# Patient Record
Sex: FEMALE
[De-identification: names, ages, dates, MRNs, and addresses within clinical notes are randomized per-mention and may not be internally consistent; named-entity substitution may affect disease eponyms.]

---

## 2021-03-12 PROBLEM — Z00.00 ENCOUNTER FOR PREVENTIVE HEALTH EXAMINATION: Status: ACTIVE | Noted: 2021-03-12

## 2021-04-29 ENCOUNTER — APPOINTMENT (OUTPATIENT)
Dept: NEUROLOGY | Facility: CLINIC | Age: 81
End: 2021-04-29
Payer: MEDICARE

## 2021-04-29 VITALS
BODY MASS INDEX: 25.3 KG/M2 | WEIGHT: 134 LBS | HEIGHT: 61 IN | SYSTOLIC BLOOD PRESSURE: 157 MMHG | HEART RATE: 84 BPM | DIASTOLIC BLOOD PRESSURE: 70 MMHG

## 2021-04-29 VITALS — TEMPERATURE: 97.3 F

## 2021-04-29 DIAGNOSIS — Z78.9 OTHER SPECIFIED HEALTH STATUS: ICD-10-CM

## 2021-04-29 DIAGNOSIS — Z86.69 PERSONAL HISTORY OF OTHER DISEASES OF THE NERVOUS SYSTEM AND SENSE ORGANS: ICD-10-CM

## 2021-04-29 DIAGNOSIS — Z86.018 PERSONAL HISTORY OF OTHER BENIGN NEOPLASM: ICD-10-CM

## 2021-04-29 DIAGNOSIS — Z86.79 PERSONAL HISTORY OF OTHER DISEASES OF THE CIRCULATORY SYSTEM: ICD-10-CM

## 2021-04-29 DIAGNOSIS — Z86.39 PERSONAL HISTORY OF OTHER ENDOCRINE, NUTRITIONAL AND METABOLIC DISEASE: ICD-10-CM

## 2021-04-29 PROCEDURE — 99204 OFFICE O/P NEW MOD 45 MIN: CPT

## 2021-04-29 RX ORDER — BRIMONIDINE TARTRATE 2 MG/MG
0.2 SOLUTION/ DROPS OPHTHALMIC
Refills: 0 | Status: ACTIVE | COMMUNITY

## 2021-04-29 NOTE — PHYSICAL EXAM
[General Appearance - Alert] : alert [Oriented To Time, Place, And Person] : oriented to person, place, and time [Person] : oriented to person [Place] : oriented to place [Time] : oriented to time [Short Term Intact] : short term memory intact [Fluency] : fluency intact [Current Events] : adequate knowledge of current events [Cranial Nerves Optic (II)] : visual acuity intact bilaterally,  visual fields full to confrontation, pupils equal round and reactive to light [Cranial Nerves Oculomotor (III)] : extraocular motion intact [Cranial Nerves Facial (VII)] : face symmetrical [Cranial Nerves Vestibulocochlear (VIII)] : hearing was intact bilaterally [Cranial Nerves Accessory (XI - Cranial And Spinal)] : head turning and shoulder shrug symmetric [Cranial Nerves Hypoglossal (XII)] : there was no tongue deviation with protrusion [Motor Tone] : muscle tone was normal in all four extremities [Motor Strength] : muscle strength was normal in all four extremities [No Muscle Atrophy] : normal bulk in all four extremities [Sensation Tactile Decrease] : light touch was intact [Sensation Pain / Temperature Decrease] : pain and temperature was intact [Abnormal Walk] : normal gait [Coordination - Dysmetria Impaired Finger-to-Nose Bilateral] : not present [1+] : Patella left 1+ [Plantar Reflex Right Only] : normal on the right [Plantar Reflex Left Only] : normal on the left [FreeTextEntry8] : While patient lifted her left foot off the ground there was a very quick contraction that involves the iliopsoas and the quadriceps

## 2021-04-29 NOTE — DISCUSSION/SUMMARY
[FreeTextEntry1] : 80-year-old woman who is here for initial consultation of gradual onset of left leg spasms that may be localized to the motor pathway in the central nervous system vs. muscle pathology.  It is less likely localized to the peripheral nerves or the lower back as her entire body had a spasm once or twice.  Will check EEG to see if there is any seizure correlation.  We will also check MRI of the brain with and without gadolinium for any signs of inflammation or lesion to account for this motor finding.  Patient will follow-up with me after the studies are completed\par \par I spent the time noted on the day of this patient encounter preparing for, providing and documenting the above E/M service and counseling and educate patient on differential, workup, disease course, and treatment/management. Education was provided to the patient during this encounter. All questions and concerns were answered and addressed in detail. The patient verbalized understanding and agreed to plan. Patient was advised to continue to monitor for neurologic symptoms and to notify my office or go to the nearest emergency room if there are any changes. Any orders/referrals and communications were provided as well. \par Side effects of the above medications were discussed in detail including but not limited to applicable black box warning and teratogenicity as appropriate. \par Patient was advised to bring previous records to my office. \par \par \par

## 2021-04-29 NOTE — HISTORY OF PRESENT ILLNESS
[FreeTextEntry1] : 80-year-old woman who is here for initial consultation of 2 years duration of left leg muscle spasms.  Patient's symptoms gradually started and there were no medication changes, motor vehicle accident or other trauma, no illness preceding this onset.  Soon it spread to the right leg however the symptoms are still predominantly on the left foot.  This occurs whenever she lifts her left leg off the ground while seated.  This never occurs whenever she walks.  Once or twice patient felt the muscle jerk throughout her body while she was in bed.  She had no alteration in her sensorium.  MRI of the brain done by Dr. Cook in 2019 was negative for stroke.

## 2021-05-20 ENCOUNTER — APPOINTMENT (OUTPATIENT)
Dept: MRI IMAGING | Facility: CLINIC | Age: 81
End: 2021-05-20
Payer: MEDICARE

## 2021-05-20 ENCOUNTER — OUTPATIENT (OUTPATIENT)
Dept: OUTPATIENT SERVICES | Facility: HOSPITAL | Age: 81
LOS: 1 days | End: 2021-05-20
Payer: MEDICARE

## 2021-05-20 DIAGNOSIS — M62.838 OTHER MUSCLE SPASM: ICD-10-CM

## 2021-05-20 PROCEDURE — G1004: CPT

## 2021-05-20 PROCEDURE — 70553 MRI BRAIN STEM W/O & W/DYE: CPT

## 2021-05-20 PROCEDURE — A9585: CPT

## 2021-05-20 PROCEDURE — 70553 MRI BRAIN STEM W/O & W/DYE: CPT | Mod: 26,MG

## 2021-05-21 ENCOUNTER — NON-APPOINTMENT (OUTPATIENT)
Age: 81
End: 2021-05-21

## 2021-05-27 ENCOUNTER — NON-APPOINTMENT (OUTPATIENT)
Age: 81
End: 2021-05-27

## 2021-06-17 ENCOUNTER — APPOINTMENT (OUTPATIENT)
Dept: NEUROLOGY | Facility: CLINIC | Age: 81
End: 2021-06-17
Payer: MEDICARE

## 2021-06-17 PROCEDURE — 95816 EEG AWAKE AND DROWSY: CPT

## 2021-06-18 PROCEDURE — 95708 EEG WO VID EA 12-26HR UNMNTR: CPT

## 2021-06-19 PROCEDURE — 95708 EEG WO VID EA 12-26HR UNMNTR: CPT

## 2021-06-20 PROCEDURE — 95700 EEG CONT REC W/VID EEG TECH: CPT

## 2021-06-20 PROCEDURE — 95705 EEG W/O VID 2-12 HR UNMNTR: CPT

## 2021-06-20 PROCEDURE — 95723 EEG PHY/QHP>60<84 HR W/O VID: CPT

## 2021-06-20 PROCEDURE — 95708 EEG WO VID EA 12-26HR UNMNTR: CPT

## 2021-06-22 ENCOUNTER — NON-APPOINTMENT (OUTPATIENT)
Age: 81
End: 2021-06-22

## 2021-08-13 ENCOUNTER — APPOINTMENT (OUTPATIENT)
Dept: NEUROLOGY | Facility: CLINIC | Age: 81
End: 2021-08-13
Payer: MEDICARE

## 2021-08-13 VITALS
DIASTOLIC BLOOD PRESSURE: 83 MMHG | BODY MASS INDEX: 25.49 KG/M2 | HEART RATE: 78 BPM | HEIGHT: 61 IN | SYSTOLIC BLOOD PRESSURE: 143 MMHG | WEIGHT: 135 LBS

## 2021-08-13 PROCEDURE — 99214 OFFICE O/P EST MOD 30 MIN: CPT

## 2021-08-13 NOTE — PHYSICAL EXAM
[General Appearance - Alert] : alert [Oriented To Time, Place, And Person] : oriented to person, place, and time [Person] : oriented to person [Place] : oriented to place [Time] : oriented to time [Short Term Intact] : short term memory intact [Fluency] : fluency intact [Current Events] : adequate knowledge of current events [Cranial Nerves Optic (II)] : visual acuity intact bilaterally,  visual fields full to confrontation, pupils equal round and reactive to light [Cranial Nerves Oculomotor (III)] : extraocular motion intact [Cranial Nerves Vestibulocochlear (VIII)] : hearing was intact bilaterally [Cranial Nerves Accessory (XI - Cranial And Spinal)] : head turning and shoulder shrug symmetric [Cranial Nerves Hypoglossal (XII)] : there was no tongue deviation with protrusion [Motor Tone] : muscle tone was normal in all four extremities [Motor Strength] : muscle strength was normal in all four extremities [No Muscle Atrophy] : normal bulk in all four extremities [Sensation Tactile Decrease] : light touch was intact [Sensation Pain / Temperature Decrease] : pain and temperature was intact [Abnormal Walk] : normal gait [Coordination - Dysmetria Impaired Finger-to-Nose Bilateral] : not present [1+] : Patella left 1+ [Plantar Reflex Right Only] : normal on the right [Plantar Reflex Left Only] : normal on the left [FreeTextEntry6] : patient has difficulty inserting her left foot into her shoe.  It hovers for a bit before it finally enters the shoe.

## 2021-08-13 NOTE — HISTORY OF PRESENT ILLNESS
[FreeTextEntry1] : 81-year-old woman who is here for initial consultation of 2 years duration of left leg muscle spasms.  Patient's symptoms gradually started and there were no medication changes, motor vehicle accident or other trauma, no illness preceding this onset.  Soon it spread to the right leg however the symptoms are still predominantly on the left foot.  This occurs whenever she lifts her left leg off the ground while seated.  This never occurs whenever she walks.  Once or twice patient felt the muscle jerk throughout her body while she was in bed.  She had no alteration in her sensorium.  MRI of the brain done by Dr. Cook in 2019 was negative for stroke.\par \par Interval history: Patient is here for follow-up.  Patient states she has no diagnosis of atrial fibrillation however she is aware that she has a fast heart rate for which she has regular follow-up with her cardiologist.  Patient MRI of the brain with and without contrast shows chronic ischemic changes in the hemispheres with no enhancing lesion or diffusion restriction.  Patient's EEG was negative as well.  Upon further exploration of her symptoms patient states that her left foot has difficulty going into her shoes.  When she was in the room patient was asked to demonstrate this and patient seems to have dysmetria whenever she is putting on her left shoe.  The right foot had no difficulties and patient has no difficulties with her hands in terms of getting them into gloves or walking up and down the stairs.  Patient notices that this is the only task where she has this difficulty\par \par

## 2021-08-13 NOTE — DISCUSSION/SUMMARY
[FreeTextEntry1] : 81-year-old woman who is here for follow-up of her leg spasms that involve less of her proximal muscles but more of her left foot in a particular task.  Will have her see my movement colleagues for evaluation of possible tasks related dystonia.  She will follow-up with me afterwards\par \par I spent the time noted on the day of this patient encounter preparing for, providing and documenting the above E/M service and counseling and educate patient on differential, workup, disease course, and treatment/management. Education was provided to the patient during this encounter. All questions and concerns were answered and addressed in detail. The patient verbalized understanding and agreed to plan. Patient was advised to continue to monitor for neurologic symptoms and to notify my office or go to the nearest emergency room if there are any changes. Any orders/referrals and communications were provided as well. \par Side effects of the above medications were discussed in detail including but not limited to applicable black box warning and teratogenicity as appropriate. \par Patient was advised to bring previous records to my office. \par \par \par

## 2021-11-18 ENCOUNTER — APPOINTMENT (OUTPATIENT)
Dept: NEUROLOGY | Facility: CLINIC | Age: 81
End: 2021-11-18
Payer: MEDICARE

## 2021-11-18 VITALS
HEIGHT: 61 IN | WEIGHT: 132 LBS | BODY MASS INDEX: 24.92 KG/M2 | HEART RATE: 88 BPM | DIASTOLIC BLOOD PRESSURE: 82 MMHG | SYSTOLIC BLOOD PRESSURE: 155 MMHG

## 2021-11-18 PROCEDURE — 99215 OFFICE O/P EST HI 40 MIN: CPT

## 2021-11-18 NOTE — DISCUSSION/SUMMARY
[FreeTextEntry1] : This is an 81-year-old right-handed female who presents with chief complaints of leg spasms since last year.  She feels that this is getting more frequent.  Spasms happen only when she is trying to move her legs a certain way such as wearing shoes and slacks\par Exam is notable for slightly reduced finger taps on the left.  No tremor or rigidity.  Reduced pinprick in the left foot gait is slow antalgic appearing\par MRI of the brain with and without contrast-small vessel ischemic changes\par EEG-normal\par \par Impression-leg spasms, myoclonic movement unclear etiology\par \par Plan\par Patient noted to have slightly reduced finger taps on the left, slow walking-we will get a DaTscan to assess dopamine function\par Spine MRI-to rule out structural causes, decreased pinprick sensation in the left foot\par Patient reports that she had recent blood work done at her cardiologist.  I have requested her to send me those reports.  If not already done would like to get B12, folate, thyroid, paraneoplastic panel\par All questions were addressed and answered\par Follow-up in 2 to 3 months

## 2021-11-18 NOTE — PHYSICAL EXAM
[FreeTextEntry1] : On exam patient is awake and alert.  She is pleasant cooperative with the exam.\par Extraocular movements are intact, face is symmetric, tongue and uvula midline and symmetric\par Strength 5/5\par Deep tendon reflexes 1+ symmetric\par Sensory exam-reduced pinprick in the dorsum of left foot\par \par No resting action or postural tremors are seen\par Hand opening and closing 1 bilaterally\par Finger taps normal on the right 1 on the left\par Leg agility is normal bilaterally\par Rapid alternating movements are normal bilaterally\par Tone is normal bilaterally\par No dysmetria or dysdiadochokinesia, heel-to-shin is intact bilaterally\par Plantars are downgoing\par Slow to get up from chair needed 2 attempts, states that her right knee hurts\par \par Gait is slow, antalgic, not shuffling, posture may be slightly stooped, slight inversion of the feet while walking left worse than right\par \par 2 episodes of leg jerking on the left were noticed.  1 when patient was trying to wear her shoe and the second when she lifted her leg up for exam

## 2021-12-15 ENCOUNTER — APPOINTMENT (OUTPATIENT)
Dept: MRI IMAGING | Facility: CLINIC | Age: 81
End: 2021-12-15
Payer: MEDICARE

## 2021-12-15 ENCOUNTER — OUTPATIENT (OUTPATIENT)
Dept: OUTPATIENT SERVICES | Facility: HOSPITAL | Age: 81
LOS: 1 days | End: 2021-12-15
Payer: MEDICARE

## 2021-12-15 DIAGNOSIS — M62.838 OTHER MUSCLE SPASM: ICD-10-CM

## 2021-12-15 PROCEDURE — 72141 MRI NECK SPINE W/O DYE: CPT | Mod: 26,MG

## 2021-12-15 PROCEDURE — 72148 MRI LUMBAR SPINE W/O DYE: CPT | Mod: 26,MG

## 2021-12-15 PROCEDURE — G1004: CPT

## 2021-12-15 PROCEDURE — 72148 MRI LUMBAR SPINE W/O DYE: CPT | Mod: MG

## 2021-12-15 PROCEDURE — 72146 MRI CHEST SPINE W/O DYE: CPT | Mod: 26,MG

## 2021-12-15 PROCEDURE — 72146 MRI CHEST SPINE W/O DYE: CPT | Mod: MG

## 2021-12-15 PROCEDURE — 72141 MRI NECK SPINE W/O DYE: CPT | Mod: MG

## 2021-12-22 ENCOUNTER — RESULT REVIEW (OUTPATIENT)
Age: 81
End: 2021-12-22

## 2021-12-29 ENCOUNTER — APPOINTMENT (OUTPATIENT)
Dept: NUCLEAR MEDICINE | Facility: IMAGING CENTER | Age: 81
End: 2021-12-29
Payer: MEDICARE

## 2021-12-29 ENCOUNTER — RESULT REVIEW (OUTPATIENT)
Age: 81
End: 2021-12-29

## 2021-12-29 ENCOUNTER — OUTPATIENT (OUTPATIENT)
Dept: OUTPATIENT SERVICES | Facility: HOSPITAL | Age: 81
LOS: 1 days | End: 2021-12-29
Payer: MEDICARE

## 2021-12-29 DIAGNOSIS — M62.838 OTHER MUSCLE SPASM: ICD-10-CM

## 2021-12-29 PROCEDURE — 78803 RP LOCLZJ TUM SPECT 1 AREA: CPT | Mod: 26,MG

## 2021-12-29 PROCEDURE — G1004: CPT

## 2021-12-29 PROCEDURE — A9584: CPT

## 2021-12-29 PROCEDURE — 78803 RP LOCLZJ TUM SPECT 1 AREA: CPT | Mod: MG

## 2022-01-14 ENCOUNTER — OUTPATIENT (OUTPATIENT)
Dept: OUTPATIENT SERVICES | Facility: HOSPITAL | Age: 82
LOS: 1 days | End: 2022-01-14
Payer: MEDICARE

## 2022-01-14 ENCOUNTER — APPOINTMENT (OUTPATIENT)
Dept: CT IMAGING | Facility: CLINIC | Age: 82
End: 2022-01-14
Payer: MEDICARE

## 2022-01-14 DIAGNOSIS — M62.838 OTHER MUSCLE SPASM: ICD-10-CM

## 2022-01-14 PROCEDURE — 71250 CT THORAX DX C-: CPT | Mod: 26,MG

## 2022-01-14 PROCEDURE — G1004: CPT

## 2022-01-14 PROCEDURE — 71250 CT THORAX DX C-: CPT | Mod: MG

## 2022-01-20 ENCOUNTER — APPOINTMENT (OUTPATIENT)
Dept: NEUROLOGY | Facility: CLINIC | Age: 82
End: 2022-01-20
Payer: MEDICARE

## 2022-01-20 PROCEDURE — 99214 OFFICE O/P EST MOD 30 MIN: CPT | Mod: 95

## 2022-01-20 NOTE — HISTORY OF PRESENT ILLNESS
[Home] : at home, [unfilled] , at the time of the visit. [Other Location: e.g. Home (Enter Location, City,State)___] : at [unfilled] [Verbal consent obtained from patient] : the patient, [unfilled] [FreeTextEntry1] : This is an 81-year-old right-handed female who was kindly referred by Dr. Licea to movement disorders clinic to evaluate bilateral leg spasm.  At this visit she is accompanied by her son Dwaine\par History is obtained from patient and from review of records\par Patient states that about last year she noticed spasms in her leg more on the left than the right especially when she was trying to do certain tasks such as wear her shoes, slacks or moves her leg a certain way.  She describes this as a quick jerky movement.  Nonpainful.  She denies any difficulty in activities such as walking getting up from a chair turning in bed.  She denies any restless leg-like symptoms.  No weakness.  No change in bowel or bladder habits.  She denies any back pain.  She denies any tremors.  She has no difficulty using utensils.\par She denies any anosmia, dysphagia, drooling, sleep disorders or fall\par She denies any weight loss or change in appetite\par \par She does report some change in her handwriting whereas its not as needed may be getting smaller.  She has difficulty with tiny hooks of jewelry and earrings.\par She denies any new medications.  She denies any history of use of antidopaminergic medication\par \par Review of systems-a complete review of systems is performed and is negative except as listed in HPI\par \par Past medical history A. fib\par Family history unremarkable\par  \par \par Interval history January 20, 2022.  Patient presents to follow-up on left leg abnormal movements.  This is a telehealth visit.  Patient wishes to follow-up on results of the work-up done which included a DaTscan MRI of the spine and CT chest.  Patient states that the symptoms continue unchanged.  She still has intermittent movement of the left leg especially when she is trying to wear her shoes or move her leg.  This is not painful is not bothersome and does not interfere with daily activities.  At times she feels that it is more intense.  She also reports having knee pain and arthritis and is undergoing physical therapy now.  She has not had the labs ordered by me however she reports that through her primary care physician she had a normal CMP including calcium level.\par

## 2022-01-20 NOTE — DISCUSSION/SUMMARY
[FreeTextEntry1] : This is an 81-year-old right-handed female who presents with chief complaints of leg spasms since last year.  She feels that this is getting more frequent.  Spasms happen only when she is trying to move her legs a certain way such as wearing shoes and slacks\par Exam is notable for slightly reduced finger taps on the left.  No tremor or rigidity.  Reduced pinprick in the left foot gait is slow antalgic appearing\par MRI of the brain with and without contrast-small vessel ischemic changes\par EEG-normal\par BETH normal\par MRI C/T - no spondylosis, no change in cord signal\par Impression-leg spasms, myoclonic movement unclear etiology\par \par Plan\par Results of DaTscan, MRI spine and CT chest were discussed with her.\par We will mail report of CT chest to patient's home address so she can discuss the thyroid signal change with her primary care physician\par At present patient states that the movements are not bothersome and would not like to try medication for it\par check B12, folate, thyroid, paraneoplastic panel ESR,MARIN -we will mail prescription to her\par All questions were addressed and answered\par Follow-up in 2 to 3 months

## 2023-09-21 ENCOUNTER — LABORATORY RESULT (OUTPATIENT)
Age: 83
End: 2023-09-21

## 2023-09-21 ENCOUNTER — APPOINTMENT (OUTPATIENT)
Dept: NEUROLOGY | Facility: CLINIC | Age: 83
End: 2023-09-21
Payer: MEDICARE

## 2023-09-21 VITALS
HEIGHT: 61 IN | SYSTOLIC BLOOD PRESSURE: 176 MMHG | DIASTOLIC BLOOD PRESSURE: 77 MMHG | BODY MASS INDEX: 23.6 KG/M2 | HEART RATE: 73 BPM | WEIGHT: 125 LBS

## 2023-09-21 DIAGNOSIS — M62.838 OTHER MUSCLE SPASM: ICD-10-CM

## 2023-09-21 PROCEDURE — 99215 OFFICE O/P EST HI 40 MIN: CPT

## 2023-09-21 RX ORDER — FUROSEMIDE 20 MG/1
20 TABLET ORAL DAILY
Refills: 0 | Status: ACTIVE | COMMUNITY

## 2023-09-22 ENCOUNTER — NON-APPOINTMENT (OUTPATIENT)
Age: 83
End: 2023-09-22

## 2023-09-22 LAB
25(OH)D3 SERPL-MCNC: 42 NG/ML
CRP SERPL-MCNC: <3 MG/L
ERYTHROCYTE [SEDIMENTATION RATE] IN BLOOD BY WESTERGREN METHOD: 104 MM/HR
FOLATE SERPL-MCNC: >20 NG/ML
T4 SERPL-MCNC: 7 UG/DL
THYROGLOB AB SERPL-ACNC: NORMAL
THYROPEROXIDASE AB SERPL IA-ACNC: <10 IU/ML
TSH SERPL-ACNC: 2.59 UIU/ML
VIT B12 SERPL-MCNC: 659 PG/ML

## 2023-09-25 LAB
ANA SER IF-ACNC: NEGATIVE
T PALLIDUM AB SER QL IA: NEGATIVE

## 2023-09-29 LAB
AMPA-R ABCBA: NEGATIVE
AMPHIPHYSIN IGG TITR SER IF: NEGATIVE
ANNOTATION COMMENT IMP: NORMAL
CASPR2-IGG CBA, S: NEGATIVE
CV2 IGG TITR SER: NEGATIVE
GABA-B ABCBA: NEGATIVE
GAD65 AB SER-MCNC: 0 NMOL/L
GLIAL NUC TYPE 1 AB TITR SER: NEGATIVE
HU1 AB TITR SER: NEGATIVE
HU2 AB TITR SER IF: NEGATIVE
HU3 AB TITR SER: NEGATIVE
IGLON5 IFA, S: NEGATIVE
IMMUNOLOGIST REVIEW: NORMAL
LGI1-IGG CBA, S: NEGATIVE
NIF IFA, S: NEGATIVE
NMDA-R ABCBA: NEGATIVE
PCA-1 AB TITR SER: NEGATIVE
PCA-2 AB TITR SER: NEGATIVE
PCA-TR AB TITR SER: NEGATIVE
VIT B1 SERPL-MCNC: 109.7 NMOL/L

## 2023-11-02 ENCOUNTER — APPOINTMENT (OUTPATIENT)
Dept: NEUROLOGY | Facility: CLINIC | Age: 83
End: 2023-11-02
Payer: MEDICARE

## 2023-11-02 VITALS
HEIGHT: 61 IN | SYSTOLIC BLOOD PRESSURE: 194 MMHG | DIASTOLIC BLOOD PRESSURE: 94 MMHG | HEART RATE: 81 BPM | BODY MASS INDEX: 23.6 KG/M2 | WEIGHT: 125 LBS

## 2023-11-02 DIAGNOSIS — R29.818 OTHER SYMPTOMS AND SIGNS INVOLVING THE NERVOUS SYSTEM: ICD-10-CM

## 2023-11-02 PROCEDURE — 99215 OFFICE O/P EST HI 40 MIN: CPT

## 2024-02-14 ENCOUNTER — APPOINTMENT (OUTPATIENT)
Dept: NEUROLOGY | Facility: CLINIC | Age: 84
End: 2024-02-14
Payer: MEDICARE

## 2024-02-14 VITALS
BODY MASS INDEX: 23.6 KG/M2 | HEART RATE: 89 BPM | OXYGEN SATURATION: 98 % | WEIGHT: 125 LBS | DIASTOLIC BLOOD PRESSURE: 85 MMHG | TEMPERATURE: 97.9 F | HEIGHT: 61 IN | SYSTOLIC BLOOD PRESSURE: 140 MMHG

## 2024-02-14 DIAGNOSIS — G31.84 MILD COGNITIVE IMPAIRMENT, SO STATED: ICD-10-CM

## 2024-02-14 PROCEDURE — 99215 OFFICE O/P EST HI 40 MIN: CPT

## 2024-02-14 PROCEDURE — G2211 COMPLEX E/M VISIT ADD ON: CPT

## 2024-02-14 RX ORDER — ASPIRIN 81 MG
81 TABLET, DELAYED RELEASE (ENTERIC COATED) ORAL
Refills: 0 | Status: COMPLETED | COMMUNITY
End: 2024-02-14

## 2024-02-14 RX ORDER — HYDROCHLOROTHIAZIDE 12.5 MG/1
12.5 TABLET ORAL
Refills: 0 | Status: COMPLETED | COMMUNITY
End: 2024-02-14

## 2024-02-14 RX ORDER — MEMANTINE HYDROCHLORIDE 14 MG/1
14 CAPSULE, EXTENDED RELEASE ORAL
Qty: 90 | Refills: 3 | Status: ACTIVE | COMMUNITY
Start: 2024-02-14 | End: 1900-01-01

## 2024-02-14 RX ORDER — VALSARTAN 80 MG/1
80 TABLET, COATED ORAL
Refills: 0 | Status: ACTIVE | COMMUNITY

## 2024-02-14 RX ORDER — AMLODIPINE BESYLATE 5 MG/1
5 TABLET ORAL DAILY
Refills: 0 | Status: COMPLETED | COMMUNITY
End: 2024-02-14

## 2024-02-14 RX ORDER — SIMVASTATIN 40 MG/1
40 TABLET, FILM COATED ORAL
Refills: 0 | Status: COMPLETED | COMMUNITY
End: 2024-02-14

## 2024-02-14 RX ORDER — CARBIDOPA AND LEVODOPA 25; 100 MG/1; MG/1
25-100 TABLET ORAL 3 TIMES DAILY
Qty: 270 | Refills: 3 | Status: COMPLETED | COMMUNITY
Start: 2023-11-02 | End: 2024-02-14

## 2024-02-14 NOTE — DISCUSSION/SUMMARY
[FreeTextEntry1] : 83-year-old woman who is here for follow-up of her cognitive impairment that is likely due to Alzheimer's disease versus Lewy bodies dementia.  With her parkinsonian symptoms 1 argues for Lewy bodies dementia however patient declines any hallucinations.  Will start Namenda extended release daily.  Patient will have repeat ESR and thyroglobulin.  Patient will follow-up with me in a couple months.  I spent the time noted on the day of this patient encounter preparing for, providing and documenting the above E/M service and counseling and educate patient on differential, workup, disease course, and treatment/management. Education was provided to the patient during this encounter. All questions and concerns were answered and addressed in detail. The patient verbalized understanding and agreed to plan. Patient was advised to continue to monitor for neurologic symptoms and to notify my office or go to the nearest emergency room if there are any changes. Any orders/referrals and communications were provided as well.  Side effects of the above medications were discussed in detail including but not limited to applicable black box warning and teratogenicity as appropriate.  Patient was advised to bring previous records to my office.

## 2024-02-14 NOTE — HISTORY OF PRESENT ILLNESS
[FreeTextEntry1] : 83-year-old woman who is here for initial consultation of 2 years duration of left leg muscle spasms.  Patient's symptoms gradually started and there were no medication changes, motor vehicle accident or other trauma, no illness preceding this onset.  Soon it spread to the right leg however the symptoms are still predominantly on the left foot.  This occurs whenever she lifts her left leg off the ground while seated.  This never occurs whenever she walks.  Once or twice patient felt the muscle jerk throughout her body while she was in bed.  She had no alteration in her sensorium.  MRI of the brain done by Dr. Cook in 2019 was negative for stroke.  Interval history: Patient is here for follow-up.  Patient states she has no diagnosis of atrial fibrillation however she is aware that she has a fast heart rate for which she has regular follow-up with her cardiologist.  Patient MRI of the brain with and without contrast shows chronic ischemic changes in the hemispheres with no enhancing lesion or diffusion restriction.  Patient's EEG was negative as well.  Upon further exploration of her symptoms patient states that her left foot has difficulty going into her shoes.  When she was in the room patient was asked to demonstrate this and patient seems to have dysmetria whenever she is putting on her left shoe.  The right foot had no difficulties and patient has no difficulties with her hands in terms of getting them into gloves or walking up and down the stairs.  Patient notices that this is the only task where she has this difficulty  Interval history September 21, 2023: Patient had extensive work-up with Dr. Mckeon for her leg spasms with negative DaTscan.  Patient continues to have bilateral leg spasms only when she is putting on her slacks or her shoes.  This was demonstrated during her visit with me today in the office.  Patient may have task specific tremors of the legs and she will follow-up with Dr. Mckeon.  Interval history February 14, 2024: Patient was recently started on Sinemet with Dr. Mckeon however she was not able to tolerate even half a tablet and she was advised to follow-up with Dr. Mckeon.  Patient has a pending neuropsych evaluation for her memory loss.  Patient's Hamblen exam is 11 out of 30.  Please see attached MoCA evaluation.  Patient had a elevation in ESR with normal CRP.  Will repeat.  Patient's thyroglobulin was also clotted and we will repeat that as well.  Patient and family and I had a lengthy discussion regarding treatment.  We discussed Namenda and adulhelm.  Patient was encouraged to be involved more socially and at perform cognitive tasks throughout the day.  Patient son was interested in starting her on Namenda.   Patient also has been having difficulties with memory such as forgetting appointments.  This started about February 2023.  There is a family history of Alzheimer's when her father was in his 80s.  Son claims that there are no hearing issues when he tells her things.

## 2024-02-14 NOTE — PHYSICAL EXAM
[General Appearance - Alert] : alert [Oriented To Time, Place, And Person] : oriented to person, place, and time [Person] : oriented to person [Place] : oriented to place [Time] : oriented to time [Short Term Intact] : short term memory intact [Fluency] : fluency intact [Current Events] : adequate knowledge of current events [Cranial Nerves Optic (II)] : visual acuity intact bilaterally,  visual fields full to confrontation, pupils equal round and reactive to light [Cranial Nerves Oculomotor (III)] : extraocular motion intact [Cranial Nerves Vestibulocochlear (VIII)] : hearing was intact bilaterally [Cranial Nerves Accessory (XI - Cranial And Spinal)] : head turning and shoulder shrug symmetric [Cranial Nerves Hypoglossal (XII)] : there was no tongue deviation with protrusion [Motor Tone] : muscle tone was normal in all four extremities [Motor Strength] : muscle strength was normal in all four extremities [No Muscle Atrophy] : normal bulk in all four extremities [Sensation Tactile Decrease] : light touch was intact [Sensation Pain / Temperature Decrease] : pain and temperature was intact [Abnormal Walk] : normal gait [Coordination - Dysmetria Impaired Finger-to-Nose Bilateral] : not present [1+] : Patella left 1+ [Plantar Reflex Right Only] : normal on the right [Plantar Reflex Left Only] : normal on the left [MocaTotal] : 11 11 [FreeTextEntry6] : patient has difficulty inserting her left foot into her shoe.  There are side-to-side tremors

## 2024-03-08 ENCOUNTER — APPOINTMENT (OUTPATIENT)
Dept: NEUROLOGY | Facility: CLINIC | Age: 84
End: 2024-03-08

## 2024-05-01 ENCOUNTER — APPOINTMENT (OUTPATIENT)
Dept: NEUROLOGY | Facility: CLINIC | Age: 84
End: 2024-05-01
Payer: MEDICARE

## 2024-05-01 PROCEDURE — 99215 OFFICE O/P EST HI 40 MIN: CPT

## 2024-05-01 PROCEDURE — 96116 NUBHVL XM PHYS/QHP 1ST HR: CPT

## 2024-05-08 ENCOUNTER — APPOINTMENT (OUTPATIENT)
Dept: NEUROLOGY | Facility: CLINIC | Age: 84
End: 2024-05-08
Payer: MEDICARE

## 2024-05-08 PROCEDURE — 96132 NRPSYC TST EVAL PHYS/QHP 1ST: CPT

## 2024-05-08 PROCEDURE — 96138 PSYCL/NRPSYC TECH 1ST: CPT | Mod: NC

## 2024-05-08 PROCEDURE — 96139 PSYCL/NRPSYC TST TECH EA: CPT | Mod: NC

## 2024-05-08 PROCEDURE — 96133 NRPSYC TST EVAL PHYS/QHP EA: CPT

## 2024-05-24 ENCOUNTER — APPOINTMENT (OUTPATIENT)
Dept: NEUROLOGY | Facility: CLINIC | Age: 84
End: 2024-05-24

## 2024-05-31 ENCOUNTER — APPOINTMENT (OUTPATIENT)
Dept: NEUROLOGY | Facility: CLINIC | Age: 84
End: 2024-05-31

## 2024-06-03 ENCOUNTER — APPOINTMENT (OUTPATIENT)
Dept: NEUROLOGY | Facility: CLINIC | Age: 84
End: 2024-06-03

## 2024-07-11 ENCOUNTER — APPOINTMENT (OUTPATIENT)
Dept: NEUROLOGY | Facility: CLINIC | Age: 84
End: 2024-07-11

## 2024-07-22 ENCOUNTER — APPOINTMENT (OUTPATIENT)
Dept: NEUROLOGY | Facility: CLINIC | Age: 84
End: 2024-07-22

## 2024-07-22 PROCEDURE — 96133 NRPSYC TST EVAL PHYS/QHP EA: CPT | Mod: 2W

## 2025-05-09 ENCOUNTER — APPOINTMENT (OUTPATIENT)
Dept: NEUROLOGY | Facility: CLINIC | Age: 85
End: 2025-05-09
Payer: MEDICARE

## 2025-05-09 VITALS
SYSTOLIC BLOOD PRESSURE: 133 MMHG | BODY MASS INDEX: 23.6 KG/M2 | HEIGHT: 61 IN | DIASTOLIC BLOOD PRESSURE: 78 MMHG | HEART RATE: 87 BPM | WEIGHT: 125 LBS

## 2025-05-09 DIAGNOSIS — F02.80 DEMENTIA WITH LEWY BODIES: ICD-10-CM

## 2025-05-09 DIAGNOSIS — F01.50 ALZHEIMER'S DISEASE, UNSPECIFIED: ICD-10-CM

## 2025-05-09 DIAGNOSIS — F02.80 ALZHEIMER'S DISEASE, UNSPECIFIED: ICD-10-CM

## 2025-05-09 DIAGNOSIS — G31.83 DEMENTIA WITH LEWY BODIES: ICD-10-CM

## 2025-05-09 DIAGNOSIS — G30.9 ALZHEIMER'S DISEASE, UNSPECIFIED: ICD-10-CM

## 2025-05-09 PROCEDURE — 99215 OFFICE O/P EST HI 40 MIN: CPT

## 2025-05-09 PROCEDURE — G2211 COMPLEX E/M VISIT ADD ON: CPT

## 2025-05-12 LAB
ERYTHROCYTE [SEDIMENTATION RATE] IN BLOOD BY WESTERGREN METHOD: 58 MM/HR
THYROGLOB AB SERPL-ACNC: 18.1 IU/ML
THYROGLOB SERPL-MCNC: 29.5 NG/ML

## 2025-05-19 ENCOUNTER — APPOINTMENT (OUTPATIENT)
Dept: NEUROLOGY | Facility: CLINIC | Age: 85
End: 2025-05-19